# Patient Record
Sex: FEMALE | Race: WHITE | Employment: OTHER
[De-identification: names, ages, dates, MRNs, and addresses within clinical notes are randomized per-mention and may not be internally consistent; named-entity substitution may affect disease eponyms.]

---

## 2017-05-31 PROBLEM — R06.02 SOB (SHORTNESS OF BREATH): Status: ACTIVE | Noted: 2017-05-31

## 2017-06-01 ENCOUNTER — HOSPITAL ENCOUNTER (OUTPATIENT)
Dept: GENERAL RADIOLOGY | Facility: CLINIC | Age: 72
End: 2017-06-01
Payer: MEDICARE

## 2017-06-01 ENCOUNTER — HOSPITAL ENCOUNTER (OUTPATIENT)
Facility: CLINIC | Age: 72
Discharge: HOME | End: 2017-06-01
Payer: MEDICARE

## 2017-06-01 ENCOUNTER — HOSPITAL ENCOUNTER (OUTPATIENT)
Dept: GENERAL RADIOLOGY | Facility: CLINIC | Age: 72
Discharge: HOME | End: 2017-06-01
Payer: MEDICARE

## 2017-06-01 DIAGNOSIS — R06.02 SOB (SHORTNESS OF BREATH): ICD-10-CM

## 2017-06-01 PROCEDURE — 71020 XR CHEST STANDARD TWO VW: CPT

## 2017-10-02 ENCOUNTER — HOSPITAL ENCOUNTER (OUTPATIENT)
Dept: GENERAL RADIOLOGY | Facility: CLINIC | Age: 72
Discharge: HOME | End: 2017-10-02
Payer: MEDICARE

## 2017-10-02 ENCOUNTER — HOSPITAL ENCOUNTER (OUTPATIENT)
Facility: CLINIC | Age: 72
Discharge: HOME | End: 2017-10-02
Payer: MEDICARE

## 2017-10-02 DIAGNOSIS — R06.02 SOB (SHORTNESS OF BREATH): ICD-10-CM

## 2017-10-02 PROCEDURE — 71020 XR CHEST STANDARD TWO VW: CPT

## 2020-03-02 ENCOUNTER — OFFICE VISIT (OUTPATIENT)
Dept: PULMONOLOGY | Age: 75
End: 2020-03-02
Payer: MEDICARE

## 2020-03-02 ENCOUNTER — TELEPHONE (OUTPATIENT)
Dept: PULMONOLOGY | Age: 75
End: 2020-03-02

## 2020-03-02 VITALS
OXYGEN SATURATION: 97 % | SYSTOLIC BLOOD PRESSURE: 109 MMHG | DIASTOLIC BLOOD PRESSURE: 58 MMHG | BODY MASS INDEX: 44.37 KG/M2 | RESPIRATION RATE: 20 BRPM | HEIGHT: 60 IN | WEIGHT: 226 LBS | HEART RATE: 80 BPM

## 2020-03-02 PROCEDURE — 99205 OFFICE O/P NEW HI 60 MIN: CPT | Performed by: INTERNAL MEDICINE

## 2020-03-02 PROCEDURE — 94726 PLETHYSMOGRAPHY LUNG VOLUMES: CPT | Performed by: INTERNAL MEDICINE

## 2020-03-02 PROCEDURE — 1036F TOBACCO NON-USER: CPT | Performed by: INTERNAL MEDICINE

## 2020-03-02 PROCEDURE — 1123F ACP DISCUSS/DSCN MKR DOCD: CPT | Performed by: INTERNAL MEDICINE

## 2020-03-02 PROCEDURE — G8427 DOCREV CUR MEDS BY ELIG CLIN: HCPCS | Performed by: INTERNAL MEDICINE

## 2020-03-02 PROCEDURE — G8484 FLU IMMUNIZE NO ADMIN: HCPCS | Performed by: INTERNAL MEDICINE

## 2020-03-02 PROCEDURE — G8417 CALC BMI ABV UP PARAM F/U: HCPCS | Performed by: INTERNAL MEDICINE

## 2020-03-02 PROCEDURE — 3017F COLORECTAL CA SCREEN DOC REV: CPT | Performed by: INTERNAL MEDICINE

## 2020-03-02 PROCEDURE — 94729 DIFFUSING CAPACITY: CPT | Performed by: INTERNAL MEDICINE

## 2020-03-02 PROCEDURE — G8400 PT W/DXA NO RESULTS DOC: HCPCS | Performed by: INTERNAL MEDICINE

## 2020-03-02 PROCEDURE — 4040F PNEUMOC VAC/ADMIN/RCVD: CPT | Performed by: INTERNAL MEDICINE

## 2020-03-02 PROCEDURE — 94060 EVALUATION OF WHEEZING: CPT | Performed by: INTERNAL MEDICINE

## 2020-03-02 PROCEDURE — 1090F PRES/ABSN URINE INCON ASSESS: CPT | Performed by: INTERNAL MEDICINE

## 2020-03-02 NOTE — TELEPHONE ENCOUNTER
[facility] sleep lab called, asking for clinicals. I informed her that the patient was just seen in the office this morning.  When Dr Vielka Olmos finishes his note, please fax it to #[phone]

## 2020-03-02 NOTE — PROGRESS NOTES
REASON FOR THE CONSULTATION:  Shortness of breath  HISTORY OF PRESENT ILLNESS:    Yessenia Miller is a 76y.o. year old female   has been having trouble breathing for last 5 years but has become worse since february . She gets sob with ADL . Running vacuum , cleaning dishes , going to  Bathroom , gets winded after 3 steps . Sob with few feet . She got cold and was sick from 12/12 to12/31 and was in er at [facility] and her o2 was low and she had pneumonia and her er doctor wanted to admit her but since pcp did not go to [facility] she was sent home and waited for her pcp to call her . X-ray report from December 31 reports mild cardiomegaly and chronically elevated right hemidiaphragm but no acute findings but December 28, 2019 x-ray done at urgent care talks about early perihilar infiltrate. She has been coughing and wheezing with her sob  since December  She was prescribed an inhaler Symbicort but she has not used it there was dust on the inhaler when she showed it to me and had to clean it and educate her how to use it properly. While getting pulmonary function test she was given 2 puffs of Symbicort. Patient noticed that she could breathe better as soon as Symbicort was provided  She has afib - she has been in afib wince last 3 years . She is on coumadin , bb . She wakes up once every hour choking and gasping for breath. She has put on about 20 pounds over the last few months  She feels fatigued and tired during the day  She lives alone so does not know if she snores or has apneas   bed time 3 am and wakes up 6 am   Frequent naps during the day    She has been told not to exercise because that can cause a A. fib to become uncontrolled    She has persistent A. Fib    No History of asthma  LUNG CANCER SCREENING     1. CRITERIA MET    []     CT ORDERED  []      2. CRITERIA NOT MET   [x]      3. REFUSED                    []        REASON CRITERIA NOT MET     1. SMOKING LESS THAN 30 PY  []      2.  AGE LESS THAN 55 or tablet Take 1 tablet by mouth daily 2/26/20  Yes Alcon Shepherd MD   atorvastatin (LIPITOR) 10 MG tablet Take 1 tablet by mouth daily 2/17/20  Yes Alcon Shepherd MD   metoprolol tartrate (LOPRESSOR) 50 MG tablet take 1 tablet by mouth three times a day 2/4/20  Yes Alcon Shepherd MD   pantoprazole (PROTONIX) 40 MG tablet Take 40 mg by mouth daily 2/18/19  Yes Historical Provider, MD   warfarin (COUMADIN) 5 MG tablet  11/25/18  Yes Historical Provider, MD   furosemide (LASIX) 20 MG tablet Take 20 mg by mouth daily 4/25/17  Yes Historical Provider, MD   meclizine (ANTIVERT) 25 MG tablet as needed 4/6/17  Yes Historical Provider, MD   nitroGLYCERIN (NITROSTAT) 0.4 MG SL tablet place 1 tablet under the tongue if needed for chest pain 1/23/17  Yes Historical Provider, MD   warfarin (COUMADIN) 2.5 MG tablet Take 2.5 mg by mouth daily   Yes Historical Provider, MD              REVIEW OF SYSTEMS:    CONSTITUTIONAL:   fatigue,   EYES:  negative for  double vision, blurred vision, dry eyes, eye discharge and redness  HEENT:  negative for  hearing loss, tinnitus, ear drainage, earaches and nasal congestion  RESPIRATORY:  See hpi  CARDIOVASCULAR:  negative for  chest pain,, +palpitations, +PND  GASTROINTESTINAL:  negative for nausea, vomiting, change in bowel habits, diarrhea, constipation, abdominal pain, pruritus, abdominal mass and abdominal distention  GENITOURINARY:  negative for frequency, dysuria, nocturia, urinary incontinence and hesitancy  INTEGUMENT  negative for rash, skin lesion(s), dryness, skin color change, changes in lesion, pruritus and changes in hair  HEMATOLOGIC/LYMPHATIC:  negative for easy bruising, bleeding, lymphadenopathy, petechiae and swelling/edema  ALLERGIC/IMMUNOLOGIC:  negative for recurrent infections, urticaria and drug reactions  ENDOCRINE:  negative for heat intolerance, cold intolerance, tremor, weight changes and change in bowel habits  MUSCULOSKELETAL:  negative for  myalgias, arthralgias, pain, joint swelling, stiff joints and decreased range of motion  NEUROLOGICAL:  negative for headaches, dizziness, seizures, memory problems, speech problems, visual disturbance and coordination problems  Skin no rash no dermatitis  Vitals:  BP (!) 109/58 (Site: Right Lower Arm)   Pulse 80   Resp 20   Ht 5' (1.524 m)   Wt 226 lb (102.5 kg)   SpO2 97% Comment: RA at rest  BMI 44.14 kg/m²     PHYSICAL EXAM:  General Appearance:    Alert, cooperative, no distress, appears stated age   Head:    Normocephalic, without obvious abnormality, atraumatic      Eyes:    PERRL, conjunctiva/corneas clear, EOM's intact   Ears:    Normal  external ear canals, both ears   Nose:   Nares normal, septum midline, mucosa normal, no drainage        or sinus tenderness   Throat:   Lips, mucosa, and tongue normal; teeth and gums normal   Neck:   Supple, symmetrical, trachea midline, no adenopathy;     thyroid:  no enlargement/tenderness/nodules; no carotid    bruit , noJVD   Back:     Symmetric, no curvature, ROM normal, no CVA tenderness   Lungs:     Ventilating all lobes without any rales, rhonchi, wheezes or dullness. No bronchial breath sounds. Chest expansion equal bilaterally    Chest Wall:    No tenderness or deformity      Heart:   Irregularly irregular rhythm normal rate, S1 and S2 normal,                           Abdomen:                                                 Pulses:                              Skin:                  Lymph nodes:                    Neurologic:                  Soft, non-tender, bowel sounds active all four quadrants,     no masses, no organomegaly         2+ and symmetric all extremities     Skin color, texture, turgor normal, no rashes or lesions       Cervical, supraclavicular not enlarged or matted or tender      CNII-XII intact, normal strength 5/5 .        Clubbing No  Lower ext edema trace  Upper ext edema no          Echocardiogram January 9, 2018  LV function 45 to 50% but dyspnea after taking Symbicort here in the office. She had not been using it because she had been told that it may or it may not help her. Also I have given her prescription for Ventolin inhaler to be used 30 minutes before exercise and as needed basis. Spacer provided     She also gets symptoms of sleep apnea and a nocturnal oximetry study which was done 23 January 2020 on room air showed that patient had 8.5 minutes time spent below 88% saturation and had 334 oxygen desaturation events. She needs a diagnostic sleep study for evaluation of underlying obstructive sleep apnea. if she does have sleep apnea then treatment of sleep apnea will help in control of atrial fibrillation and hypertension. I will arrange sleep study. We will obtain chest x-ray  Recommend patient get a repeat echocardiogram    Discontinue Singulair    Patient is frustrated because of her chronic dyspnea I had extensive discussion with patient and her daughter. Follow-up in 4 weeks    Requested Prescriptions     Signed Prescriptions Disp Refills    VENTOLIN  (90 Base) MCG/ACT inhaler 1 Inhaler 2     Sig: Inhale 2 puffs into the lungs as needed for Wheezing or Shortness of Breath       Medications Discontinued During This Encounter   Medication Reason    montelukast (SINGULAIR) 10 MG tablet Therapy completed    VENTOLIN  (90 Base) MCG/ACT inhaler REORDER       Karen received counseling on the following healthy behaviors: nutrition, exercise and medication adherence    Patient given educational materials : see patient instruction     I spent more than 60 minutes examining, evaluating, reviewing data and counseling the patient. Greater than 50% of time was spent face-to-face with the patient   Discussed use, benefit, and side effects of prescribed medications. Barriers to medication compliance addressed. All patient questions answered. Pt voiced understanding.    I hope this updates you on my evaluation and clinical thinking. Thank you for allowing me to participate in his care. Sincerely,      Electronically signed by Dileep Ngo MD on   3/2/20 at 9:10 AM       Please note that this chart was generated using voice recognition Dragon dictation software. Although every effort was made to ensure the accuracy of this automated transcription, some errors in transcription may have occurred.

## 2020-03-05 ENCOUNTER — HOSPITAL ENCOUNTER (OUTPATIENT)
Dept: GENERAL RADIOLOGY | Age: 75
Discharge: HOME | End: 2020-03-07
Payer: MEDICARE

## 2020-03-05 ENCOUNTER — HOSPITAL ENCOUNTER (OUTPATIENT)
Age: 75
Discharge: HOME | End: 2020-03-07
Payer: MEDICARE

## 2020-03-05 PROCEDURE — 71046 X-RAY EXAM CHEST 2 VIEWS: CPT

## 2022-06-28 ENCOUNTER — OFFICE VISIT (OUTPATIENT)
Dept: DERMATOLOGY | Age: 77
End: 2022-06-28
Payer: MEDICARE

## 2022-06-28 VITALS
WEIGHT: 209 LBS | OXYGEN SATURATION: 95 % | BODY MASS INDEX: 41.03 KG/M2 | HEIGHT: 60 IN | SYSTOLIC BLOOD PRESSURE: 135 MMHG | HEART RATE: 80 BPM | DIASTOLIC BLOOD PRESSURE: 84 MMHG | TEMPERATURE: 97.4 F

## 2022-06-28 DIAGNOSIS — L82.0 INFLAMED SEBORRHEIC KERATOSIS: ICD-10-CM

## 2022-06-28 DIAGNOSIS — L40.9 SCALP PSORIASIS: ICD-10-CM

## 2022-06-28 DIAGNOSIS — L82.1 SEBORRHEIC KERATOSES: Primary | ICD-10-CM

## 2022-06-28 PROCEDURE — 99204 OFFICE O/P NEW MOD 45 MIN: CPT | Performed by: PHYSICIAN ASSISTANT

## 2022-06-28 PROCEDURE — 17110 DESTRUCTION B9 LES UP TO 14: CPT | Performed by: PHYSICIAN ASSISTANT

## 2022-06-28 PROCEDURE — G8427 DOCREV CUR MEDS BY ELIG CLIN: HCPCS | Performed by: PHYSICIAN ASSISTANT

## 2022-06-28 PROCEDURE — G8400 PT W/DXA NO RESULTS DOC: HCPCS | Performed by: PHYSICIAN ASSISTANT

## 2022-06-28 PROCEDURE — 1090F PRES/ABSN URINE INCON ASSESS: CPT | Performed by: PHYSICIAN ASSISTANT

## 2022-06-28 PROCEDURE — 1036F TOBACCO NON-USER: CPT | Performed by: PHYSICIAN ASSISTANT

## 2022-06-28 PROCEDURE — 1123F ACP DISCUSS/DSCN MKR DOCD: CPT | Performed by: PHYSICIAN ASSISTANT

## 2022-06-28 PROCEDURE — G8417 CALC BMI ABV UP PARAM F/U: HCPCS | Performed by: PHYSICIAN ASSISTANT

## 2022-06-28 RX ORDER — APIXABAN 5 MG/1
TABLET, FILM COATED ORAL
COMMUNITY
Start: 2022-06-16

## 2022-06-28 RX ORDER — ESOMEPRAZOLE MAGNESIUM 40 MG/1
CAPSULE, DELAYED RELEASE ORAL
COMMUNITY
Start: 2022-06-27

## 2022-06-28 RX ORDER — DILTIAZEM HYDROCHLORIDE 120 MG/1
CAPSULE, COATED, EXTENDED RELEASE ORAL
COMMUNITY
Start: 2022-06-24

## 2022-06-28 RX ORDER — NITROFURANTOIN 25; 75 MG/1; MG/1
CAPSULE ORAL
COMMUNITY
Start: 2022-05-05

## 2022-06-28 RX ORDER — MONTELUKAST SODIUM 10 MG/1
TABLET ORAL
COMMUNITY
Start: 2022-06-07

## 2022-06-28 RX ORDER — DILTIAZEM HYDROCHLORIDE 120 MG/1
120 CAPSULE, EXTENDED RELEASE ORAL DAILY
COMMUNITY
Start: 2022-06-24

## 2022-06-28 RX ORDER — DICYCLOMINE HYDROCHLORIDE 10 MG/1
10 CAPSULE ORAL 2 TIMES DAILY PRN
COMMUNITY
Start: 2022-05-03

## 2022-06-28 RX ORDER — AZITHROMYCIN 250 MG/1
TABLET, FILM COATED ORAL
COMMUNITY
Start: 2022-06-15

## 2022-06-28 RX ORDER — SAW/VIT E/SOD SEL/LYC/BETA/PYG 160-100
TABLET ORAL
COMMUNITY
Start: 2022-04-12

## 2022-06-28 RX ORDER — POLYETHYLENE GLYCOL 3350 17 G
POWDER IN PACKET (EA) ORAL
COMMUNITY
Start: 2022-06-08

## 2022-06-28 RX ORDER — DOCUSATE SODIUM 100 MG/1
CAPSULE, LIQUID FILLED ORAL
COMMUNITY
Start: 2022-06-07

## 2022-06-28 RX ORDER — CETIRIZINE HYDROCHLORIDE 10 MG/1
TABLET ORAL
COMMUNITY
Start: 2022-05-03

## 2022-06-28 RX ORDER — CLOBETASOL PROPIONATE 0.46 MG/ML
SOLUTION TOPICAL
Qty: 50 ML | Refills: 3 | Status: SHIPPED | OUTPATIENT
Start: 2022-06-28 | End: 2022-09-27

## 2022-06-28 RX ORDER — OXYCODONE HYDROCHLORIDE AND ACETAMINOPHEN 5; 325 MG/1; MG/1
TABLET ORAL
COMMUNITY
Start: 2022-06-22

## 2022-06-28 NOTE — PROGRESS NOTES
Dermatology Patient Note  [address]  [facility]  Dept: [phone]  Dept Fax: [phone]      VISITDATE: 6/28/2022   REFERRING PROVIDER: No ref. provider found      Deborah Cali is a 68 y.o. female  who presents today in the office for:    New Patient (dry scalp- dry itchy scalp ) and Other (FBSC- SK's on the face. )      HISTORY OF PRESENT ILLNESS:  As above. Pt states that lesion on back is tender/irritated when bra rubs against it. Also c/o scaling dermatitis on scalp. FH/O psoriasis. Pt c/o joint pains and swelling, particularly right 4th finger. Also has trouble opening jars d/t pain in wrist.  She has had two episodes of bowel impaction, which she is being w/u for currently. MEDICAL PROBLEMS:  Patient Active Problem List    Diagnosis Date Noted    SOB (shortness of breath) 05/31/2017    Hypertension     Atrial fibrillation (HCC)     Hyperlipidemia        CURRENT MEDICATIONS:   Current Outpatient Medications   Medication Sig Dispense Refill    ELIQUIS 5 MG TABS tablet take 1 tablet by mouth twice a day      dilTIAZem (CARDIZEM CD) 120 MG extended release capsule       docusate sodium (COLACE) 100 MG capsule TAKE 1 CAPSULE BY MOUTH IN THE MORNING AND BEFORE BEDTIME      dilTIAZem (TIAZAC) 120 MG extended release capsule Take 120 mg by mouth daily      esomeprazole (NEXIUM) 40 MG delayed release capsule       clobetasol (TEMOVATE) 0.05 % external solution Apply topically 2 times daily, x 2 weeks, then twice daily as needed.  50 mL 3    metoprolol tartrate (LOPRESSOR) 50 MG tablet take 1 tablet by mouth three times a day 270 tablet 3    atorvastatin (LIPITOR) 10 MG tablet take 1 tablet by mouth once daily 90 tablet 2    lisinopril (PRINIVIL;ZESTRIL) 2.5 MG tablet take 1 tablet by mouth once daily 90 tablet 2    digoxin (LANOXIN) 125 MCG tablet take 1 tablet by mouth once daily 30 tablet 5    Calcium Carbonate-Vitamin D (CALCIUM 500/VITAMIN D PO) Take 1 tablet by mouth daily      VENTOLIN  (90 Base) MCG/ACT inhaler Inhale 2 puffs into the lungs as needed for Wheezing or Shortness of Breath 1 Inhaler 2    furosemide (LASIX) 20 MG tablet Take 20 mg by mouth daily  0    meclizine (ANTIVERT) 25 MG tablet as needed  0    nitroGLYCERIN (NITROSTAT) 0.4 MG SL tablet place 1 tablet under the tongue if needed for chest pain  0    cetirizine (ZYRTEC) 10 MG tablet take 1 tablet by mouth every morning      dicyclomine (BENTYL) 10 MG capsule Take 10 mg by mouth 2 times daily as needed      Probiotic Product (PROBIOTIC & ACIDOPHILUS EX ST) CAPS TAKE 1 CAPSULE BY MOUTH DAILY      montelukast (SINGULAIR) 10 MG tablet take 0.5 tablets by mouth nightly      oxyCODONE-acetaminophen (PERCOCET) 5-325 MG per tablet TAKE 1 TABLET BY MOUTH DAILY AS NEEDED FOR PAIN FOR UP TO 30 DAYS. MAX DAILY AMOUNT 1 TABLET      nitrofurantoin, macrocrystal-monohydrate, (MACROBID) 100 MG capsule TAKE 1 CAPSULE BY MOUTH IN AM AND 1 CAPSULE BEFOR EBEDTIME -- DO ALL THIS FOR 7 DAYS      RA LAXATIVE 17 GM/SCOOP powder take 17GM (DISSOLVED IN WATER) by mouth every morning      azithromycin (ZITHROMAX) 250 MG tablet take 2 tablets by mouth on day 1 IN ONE DOSE then 1 tablet on days 2 through 5      pantoprazole (PROTONIX) 40 MG tablet Take 40 mg by mouth daily  0    warfarin (COUMADIN) 5 MG tablet   0    warfarin (COUMADIN) 2.5 MG tablet Take 2.5 mg by mouth daily       No current facility-administered medications for this visit.        ALLERGIES:   Allergies   Allergen Reactions    Nsaids Other (See Comments)     \"passed out\" and blisters on skin    Penicillins Hives and Rash    Niacin Hives    Prednisone      Other reaction(s): rash, Tachycardia    Bee Pollen     Niacin And Related     Other      Steroids    Pentazocine      Other reaction(s): rash  unknown      Pollen Extract     Sulfa Antibiotics Other (See Comments)     unknown    Codeine      Other reaction(s): Vomiting       SOCIAL HISTORY:  Social History     Tobacco Use    Smoking status: Never Smoker    Smokeless tobacco: Never Used   Substance Use Topics    Alcohol use: Not on file       Pertinent ROS:  Review of Systems  Skin: Denies any new changing, growing or bleeding lesions or rashes except as described in the HPI   Constitutional: Denies fevers, chills, and malaise. PHYSICAL EXAM:   /84   Pulse 80   Temp 97.4 °F (36.3 °C) (Temporal)   Ht 5' (1.524 m)   Wt 209 lb (94.8 kg)   SpO2 95%   Breastfeeding No   BMI 40.82 kg/m²     The patient is generally well appearing, well nourished, alert and conversational. Affect is normal.    Cutaneous Exam:  Physical Exam  Focused exam of face, neck, back, and distal UE was performed    Facial covering was not removed during examination. Diagnoses/exam findings/medical history pertinent to this visit are listed below:    Assessment:   Diagnosis Orders   1. Seborrheic keratoses     2. Scalp psoriasis  clobetasol (TEMOVATE) 0.05 % external solution   3. Inflamed seborrheic keratosis  NH DESTRUC PREMALIGNANT, FIRST LESION        Plan:  1. Seborrheic keratoses  - reassurance and education    2. Scalp psoriasis  - Likely PsA as well, however, I want to make sure that neoplastic cause is r/o prior to initiating Tx. Devika Vardaman may be the best option for this pt.  - clobetasol (TEMOVATE) 0.05 % external solution; Apply topically 2 times daily, x 2 weeks, then twice daily as needed. Dispense: 50 mL; Refill: 3    3. Inflamed seborrheic keratosis  After discussion of the risks, benefits, and alternative therapies available, the patient elected to proceed. After obtaining written informed consent, the patient's identity, procedure, and site were verified during a time out prior to proceeding procedure.  The lesions on the Mid upper back and right neck (x2) were treated using liquid nitrogen spray gun for 6 second per cycle, 2 cycles total. The patient tolerated the procedure well and there were no immediate complications.  - CT DESTRUC PREMALIGNANT, FIRST LESION      RTC 4M    Future Appointments   Date Time Provider Adam Langford   10/28/2022  9:30 AM Benito Hanks PA-C  [facility]         There are no Patient Instructions on file for this visit.       Electronically signed by Benito Hanks PA-C on 6/28/22 at 11:51 AM EDT

## 2022-06-29 DIAGNOSIS — L40.9 PSORIASIS: Primary | ICD-10-CM

## 2022-06-29 RX ORDER — FLUOCINOLONE ACETONIDE 0.1 MG/ML
SOLUTION TOPICAL
Qty: 90 ML | Refills: 1 | Status: SHIPPED | OUTPATIENT
Start: 2022-06-29 | End: 2022-09-27

## 2022-09-27 DIAGNOSIS — L40.9 PSORIASIS: Primary | ICD-10-CM

## 2022-09-27 RX ORDER — FLUOCINOLONE ACETONIDE 0.1 MG/ML
SOLUTION TOPICAL
Qty: 90 ML | Refills: 3 | Status: SHIPPED | OUTPATIENT
Start: 2022-09-27

## 2022-09-28 ENCOUNTER — TELEPHONE (OUTPATIENT)
Dept: DERMATOLOGY | Age: 77
End: 2022-09-28

## 2022-09-28 NOTE — TELEPHONE ENCOUNTER
Goal Outcome Evaluation:  Plan of Care Reviewed With: patient        Progress: no change  Outcome Summary: Patient slept all night with no concerns or complaints.   LMOM

## 2022-09-28 NOTE — TELEPHONE ENCOUNTER
Please advise pt that I have called in a preferred option, according to her insurance. She should understand that this solution is much less potent than the medication previously written, and that we will have to prior authorize the first medication, should she fail this one.

## 2022-10-07 ENCOUNTER — HOSPITAL ENCOUNTER (OUTPATIENT)
Age: 77
Setting detail: SPECIMEN
Discharge: HOME | End: 2022-10-07

## 2022-10-07 LAB
CREAT SERPL-MCNC: 1.16 MG/DL (ref 0.5–0.9)
GFR SERPL CREATININE-BSD FRML MDRD: 49 ML/MIN/1.73M2

## 2022-10-28 ENCOUNTER — OFFICE VISIT (OUTPATIENT)
Dept: DERMATOLOGY | Age: 77
End: 2022-10-28
Payer: MEDICARE

## 2022-10-28 VITALS
OXYGEN SATURATION: 95 % | TEMPERATURE: 97.3 F | HEIGHT: 60 IN | HEART RATE: 69 BPM | WEIGHT: 212 LBS | SYSTOLIC BLOOD PRESSURE: 125 MMHG | DIASTOLIC BLOOD PRESSURE: 80 MMHG | BODY MASS INDEX: 41.62 KG/M2

## 2022-10-28 DIAGNOSIS — L82.0 INFLAMED SEBORRHEIC KERATOSIS: ICD-10-CM

## 2022-10-28 DIAGNOSIS — L40.9 PSORIASIS: ICD-10-CM

## 2022-10-28 DIAGNOSIS — L72.3 INFLAMED EPIDERMOID CYST OF SKIN: Primary | ICD-10-CM

## 2022-10-28 PROCEDURE — G8400 PT W/DXA NO RESULTS DOC: HCPCS | Performed by: PHYSICIAN ASSISTANT

## 2022-10-28 PROCEDURE — 1090F PRES/ABSN URINE INCON ASSESS: CPT | Performed by: PHYSICIAN ASSISTANT

## 2022-10-28 PROCEDURE — 99213 OFFICE O/P EST LOW 20 MIN: CPT | Performed by: PHYSICIAN ASSISTANT

## 2022-10-28 PROCEDURE — 1036F TOBACCO NON-USER: CPT | Performed by: PHYSICIAN ASSISTANT

## 2022-10-28 PROCEDURE — 1123F ACP DISCUSS/DSCN MKR DOCD: CPT | Performed by: PHYSICIAN ASSISTANT

## 2022-10-28 PROCEDURE — G8417 CALC BMI ABV UP PARAM F/U: HCPCS | Performed by: PHYSICIAN ASSISTANT

## 2022-10-28 PROCEDURE — 17110 DESTRUCTION B9 LES UP TO 14: CPT | Performed by: PHYSICIAN ASSISTANT

## 2022-10-28 PROCEDURE — G8484 FLU IMMUNIZE NO ADMIN: HCPCS | Performed by: PHYSICIAN ASSISTANT

## 2022-10-28 PROCEDURE — 3074F SYST BP LT 130 MM HG: CPT | Performed by: PHYSICIAN ASSISTANT

## 2022-10-28 PROCEDURE — G8427 DOCREV CUR MEDS BY ELIG CLIN: HCPCS | Performed by: PHYSICIAN ASSISTANT

## 2022-10-28 PROCEDURE — 3078F DIAST BP <80 MM HG: CPT | Performed by: PHYSICIAN ASSISTANT

## 2022-10-28 RX ORDER — ALPRAZOLAM 0.25 MG/1
TABLET ORAL
COMMUNITY
Start: 2022-09-12

## 2022-10-28 RX ORDER — SUCRALFATE 1 G/1
TABLET ORAL
COMMUNITY
Start: 2022-10-03

## 2022-10-29 RX ORDER — CLOBETASOL PROPIONATE 0.5 MG/G
AEROSOL, FOAM TOPICAL
Qty: 50 G | Refills: 2 | Status: SHIPPED | OUTPATIENT
Start: 2022-10-29

## 2022-10-29 NOTE — PROGRESS NOTES
Dermatology Patient Note  [address]  [facility]  Dept: [phone]  Dept Fax: [phone]      VISITDATE: 10/28/2022   REFERRING PROVIDER: No ref. provider found      Howie Bahena is a 68 y.o. female  who presents today in the office for:    Follow-up (Here for a follow up on her scalp psor-states that the synalar is not helping and her scalp is very itchy. /Mole on back that is sore and bleed )      HISTORY OF PRESENT ILLNESS:  As above. Lesion on back has ruptured and drained malodorous drainage. Scaly lesions, on nose and left cheek, x months. Lesions are pruritic at times. MEDICAL PROBLEMS:  Patient Active Problem List    Diagnosis Date Noted    SOB (shortness of breath) 05/31/2017    Hypertension     Atrial fibrillation (HCC)     Hyperlipidemia        CURRENT MEDICATIONS:   Current Outpatient Medications   Medication Sig Dispense Refill    clobetasol (OLUX) 0.05 % foam Apply to scalp twice daily, as needed, for itching. 50 g 2    sucralfate (CARAFATE) 1 GM tablet TAKE 1 TABLET BY MOUTH THREE TIMES A DAY MORNING, NOON, AND EVENING      ALPRAZolam (XANAX) 0.25 MG tablet take 1 tablet by mouth at bedtime if needed      fluocinolone acetonide (SYNALAR) 0.01 % external solution Apply to scalp twice daily, as needed, for itching.  90 mL 3    ELIQUIS 5 MG TABS tablet take 1 tablet by mouth twice a day      cetirizine (ZYRTEC) 10 MG tablet take 1 tablet by mouth every morning      dicyclomine (BENTYL) 10 MG capsule Take 10 mg by mouth 2 times daily as needed      dilTIAZem (CARDIZEM CD) 120 MG extended release capsule       docusate sodium (COLACE) 100 MG capsule TAKE 1 CAPSULE BY MOUTH IN THE MORNING AND BEFORE BEDTIME      dilTIAZem (TIAZAC) 120 MG extended release capsule Take 120 mg by mouth daily      Probiotic Product (PROBIOTIC & ACIDOPHILUS EX ST) CAPS TAKE 1 CAPSULE BY MOUTH DAILY      montelukast (SINGULAIR) 10 MG tablet take 0.5 tablets by mouth nightly      oxyCODONE-acetaminophen (PERCOCET) 5-325 MG per tablet TAKE 1 TABLET BY MOUTH DAILY AS NEEDED FOR PAIN FOR UP TO 30 DAYS. MAX DAILY AMOUNT 1 TABLET      nitrofurantoin, macrocrystal-monohydrate, (MACROBID) 100 MG capsule TAKE 1 CAPSULE BY MOUTH IN AM AND 1 CAPSULE BEFOR EBEDTIME -- DO ALL THIS FOR 7 DAYS      RA LAXATIVE 17 GM/SCOOP powder take 17GM (DISSOLVED IN WATER) by mouth every morning      esomeprazole (NEXIUM) 40 MG delayed release capsule       azithromycin (ZITHROMAX) 250 MG tablet take 2 tablets by mouth on day 1 IN ONE DOSE then 1 tablet on days 2 through 5 (Patient not taking: Reported on 10/28/2022)      metoprolol tartrate (LOPRESSOR) 50 MG tablet take 1 tablet by mouth three times a day 270 tablet 3    atorvastatin (LIPITOR) 10 MG tablet take 1 tablet by mouth once daily 90 tablet 2    lisinopril (PRINIVIL;ZESTRIL) 2.5 MG tablet take 1 tablet by mouth once daily 90 tablet 2    digoxin (LANOXIN) 125 MCG tablet take 1 tablet by mouth once daily 30 tablet 5    Calcium Carbonate-Vitamin D (CALCIUM 500/VITAMIN D PO) Take 1 tablet by mouth daily      VENTOLIN  (90 Base) MCG/ACT inhaler Inhale 2 puffs into the lungs as needed for Wheezing or Shortness of Breath 1 Inhaler 2    pantoprazole (PROTONIX) 40 MG tablet Take 40 mg by mouth daily  0    warfarin (COUMADIN) 5 MG tablet   0    furosemide (LASIX) 20 MG tablet Take 20 mg by mouth daily  0    meclizine (ANTIVERT) 25 MG tablet as needed  0    nitroGLYCERIN (NITROSTAT) 0.4 MG SL tablet place 1 tablet under the tongue if needed for chest pain  0    warfarin (COUMADIN) 2.5 MG tablet Take 2.5 mg by mouth daily       No current facility-administered medications for this visit.        ALLERGIES:   Allergies   Allergen Reactions    Nsaids Other (See Comments)     \"passed out\" and blisters on skin    Penicillins Hives and Rash    Niacin Hives    Prednisone      Other reaction(s): rash, Tachycardia    Bee Pollen     Niacin And Related     Other      Steroids    Pentazocine      Other reaction(s): rash  unknown      Pollen Extract     Sulfa Antibiotics Other (See Comments)     unknown    Codeine      Other reaction(s): Vomiting       SOCIAL HISTORY:  Social History     Tobacco Use    Smoking status: Never    Smokeless tobacco: Never   Substance Use Topics    Alcohol use: Not on file       Pertinent ROS:  Review of Systems  Skin: Denies any new changing, growing or bleeding lesions or rashes except as described in the HPI   Constitutional: Denies fevers, chills, and malaise. PHYSICAL EXAM:   /80   Pulse 69   Temp 97.3 °F (36.3 °C)   Ht 5' (1.524 m)   Wt 212 lb (96.2 kg)   SpO2 95%   BMI 41.40 kg/m²     The patient is generally well appearing, well nourished, alert and conversational. Affect is normal.    Cutaneous Exam:  Physical Exam  Focused exam of back, scalp, face, and neck was performed    Facial covering was removed during examination. Diagnoses/exam findings/medical history pertinent to this visit are listed below:    Assessment:   Diagnosis Orders   1. Inflamed epidermoid cyst of skin        2. Psoriasis  clobetasol (OLUX) 0.05 % foam      3. Inflamed seborrheic keratosis  [place] DESTRUCTION BENIGN LESIONS UP TO 14           Plan:  1. Inflamed epidermoid cyst of skin  - Excision, if desired, in 8 weeks. Lining is crenated presently. 2. Psoriasis  - chronic illness, responding to treatment but not yet at goal   - clobetasol (OLUX) 0.05 % foam; Apply to scalp twice daily, as needed, for itching. Dispense: 50 g; Refill: 2    3. Inflamed seborrheic keratosis  After discussion of the risks, benefits, and alternative therapies available, the patient elected to proceed. After obtaining written informed consent, the patient's identity, procedure, and site were verified during a time out prior to proceeding procedure.  The lesions on the nose and left cheek were treated using liquid nitrogen spray gun for 6 second per cycle, 2 cycles total. The patient tolerated the procedure well and there were no immediate complications.   - ME DESTRUCTION BENIGN LESIONS UP TO 14      RTC Excision    Future Appointments   Date Time Provider Adam Anastasiya   1/4/2023  9:00 AM Jose Lugo PA-C  [facility]         There are no Patient Instructions on file for this visit.       Electronically signed by Jose Lugo PA-C on 10/29/22 at 7:20 PM EDT

## 2023-01-03 ENCOUNTER — TELEPHONE (OUTPATIENT)
Dept: DERMATOLOGY | Age: 78
End: 2023-01-03

## 2023-01-03 NOTE — TELEPHONE ENCOUNTER
Patient is taking blood thinners. Patient wants to know should she stop taking the blood thinners before her dermatology procedure on 1/4/2023.

## 2023-01-04 NOTE — TELEPHONE ENCOUNTER
Future Appointments   Date Time Provider Adam Anastasiya   2/15/2023 11:00 AM MARIE Patterson    Informed her of blood thinners

## 2023-02-15 ENCOUNTER — PROCEDURE VISIT (OUTPATIENT)
Dept: DERMATOLOGY | Age: 78
End: 2023-02-15

## 2023-02-15 VITALS
DIASTOLIC BLOOD PRESSURE: 78 MMHG | BODY MASS INDEX: 42.69 KG/M2 | TEMPERATURE: 97.1 F | HEART RATE: 66 BPM | OXYGEN SATURATION: 95 % | WEIGHT: 218.6 LBS | SYSTOLIC BLOOD PRESSURE: 117 MMHG

## 2023-02-15 DIAGNOSIS — L72.3 INFLAMED EPIDERMOID CYST OF SKIN: Primary | ICD-10-CM

## 2023-02-15 RX ORDER — LIDOCAINE HYDROCHLORIDE AND EPINEPHRINE 10; 10 MG/ML; UG/ML
8 INJECTION, SOLUTION INFILTRATION; PERINEURAL ONCE
Status: SHIPPED | OUTPATIENT
Start: 2023-02-15

## 2023-02-15 RX ORDER — FUROSEMIDE 40 MG/1
TABLET ORAL
COMMUNITY
Start: 2022-11-20

## 2023-02-15 NOTE — PROGRESS NOTES
Dermatology Surgery Pre-Visit Checklist    (Please complete with  Y (yes) / N (no) or explain)    Pathologic Diagnosis: Inflamed epidermoid cyst of skin    Photo available of surgery site in media: no    Competent to give informed consent?  yes   If not, who is authorized to do so: na    Need for help for wound care: no   If so, who will do so: na    Are you diagnosed:   Diabetes: no   If yes, last A1C: na       HIV: no       Hepatitis: no       Current smoker: no  If yes, how much: na    So you have any of the following: Pacemaker: yes       Defibrillator: no       Artificial Heart valve: no                Artificial Joints: no       Other Implantable Device: knee replacement       Organ Transplant: no       Other: na    Are you on blood thinners such as: Asprin: no       Warfarin/Coumadin: yes       Other: no    Any allergies to the following:  Lidocaine: no       Iodine: no       Adhesive: yes       Other: no

## 2023-02-15 NOTE — PROGRESS NOTES
Dermatology Procedure Note   [id number] [facility] DERMATOLOGY  [address]  [id number]  Dept: [phone]  Dept Fax: [phone]      Procedure Date: 2/15/2023  Procedure Time: 12:30 PM    Procedure Practitioner: Jaguar Salcido PA-C    Procedure: Excision    Pre-Procedure Diagnosis: Cyst    Post-Procedure Diagnosis: Same as Pre-Procedure Diagnosis    Informed Consent: The procedure and its risks were explained including but not limited to pain, bleeding, infection, permanent scar, permanent pigment alteration and recurrence. Consent to proceed with the procedure was obtained from the patient or the parent by the practitioner    Time Out:  A time out was conducted immediately before starting the procedure that confirmed a final verification of the correct patient, correct procedure, and correct site. Procedure Details:  Excision and layered closure: The 12 mm lesion on the Left mid back was cleaned with chlorhexidine and anesthetized with 1% lidocaine with epinephrine. The lesion was then dissected out from an elliptical incision down to subcutaneous fat with a 1 mm margin for a total excised diameter of 14 mm. Hemostasis was then achieved spontaneously. Due to tension on the defect, undermining was performed to allow for closure. The subcutaneous tissues were then brought together with 3-0 Vicryl. The epidermis was approximated with 4-0Ethilon. running sutures. Final layered closure was 3.1 cm. Vaseline and a bulky wound dressing was applied.      Procedure Performed By: Jaguar Salcido PA-C    Estimated Blood Loss: Minimal    Pathologic Specimen: H&E    Procedure Tolerance: Good    Complication(s): None    Electronically signed by Jaguar Salcido PA-C on 2/15/23 at 12:30 PM EST

## 2023-02-17 ENCOUNTER — NURSE ONLY (OUTPATIENT)
Dept: LAB | Age: 78
End: 2023-02-17

## 2023-02-23 ENCOUNTER — TELEPHONE (OUTPATIENT)
Dept: DERMATOLOGY | Age: 78
End: 2023-02-23

## 2023-03-01 ENCOUNTER — NURSE ONLY (OUTPATIENT)
Dept: DERMATOLOGY | Age: 78
End: 2023-03-01

## 2023-03-01 VITALS — HEART RATE: 98 BPM | TEMPERATURE: 98.6 F | OXYGEN SATURATION: 95 %

## 2023-03-01 DIAGNOSIS — Z48.02 VISIT FOR SUTURE REMOVAL: Primary | ICD-10-CM

## 2023-03-01 PROCEDURE — 99024 POSTOP FOLLOW-UP VISIT: CPT | Performed by: PHYSICIAN ASSISTANT

## 2023-03-01 NOTE — PROGRESS NOTES
Cuba Mohsen presented for suture removal on the back. The biopsy site at the time of today's visit was well healed. The suture was removed and a band-aid applied. See media for suture removal site photo. The patient was instructed by nursing staff/ and or attending provider to continue to wash the site daily with soap and warm water. As well as to continue to keep the lesion/excision site/ biopsy site covered with a band-aid, and re-apply Vaseline  to the site daily as needed, until healed. The patient left in good condition.

## 2023-03-06 ENCOUNTER — TELEPHONE (OUTPATIENT)
Dept: DERMATOLOGY | Age: 78
End: 2023-03-06